# Patient Record
Sex: MALE | Race: WHITE | NOT HISPANIC OR LATINO | Employment: STUDENT | URBAN - METROPOLITAN AREA
[De-identification: names, ages, dates, MRNs, and addresses within clinical notes are randomized per-mention and may not be internally consistent; named-entity substitution may affect disease eponyms.]

---
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
## 2018-01-07 ENCOUNTER — HEALTH MAINTENANCE LETTER (OUTPATIENT)
Age: 18
End: 2018-01-07

## 2020-06-28 ENCOUNTER — HOSPITAL ENCOUNTER (EMERGENCY)
Facility: CLINIC | Age: 20
Discharge: HOME | End: 2020-06-28
Attending: NURSE PRACTITIONER | Admitting: NURSE PRACTITIONER
Payer: COMMERCIAL

## 2020-06-28 VITALS
SYSTOLIC BLOOD PRESSURE: 127 MMHG | HEIGHT: 73 IN | DIASTOLIC BLOOD PRESSURE: 83 MMHG | BODY MASS INDEX: 24.52 KG/M2 | TEMPERATURE: 98.1 F | WEIGHT: 185 LBS | RESPIRATION RATE: 16 BRPM | OXYGEN SATURATION: 99 %

## 2020-06-28 DIAGNOSIS — T14.8XXA AVULSION, SKIN: ICD-10-CM

## 2020-06-28 PROCEDURE — 25000128 H RX IP 250 OP 636: Performed by: NURSE PRACTITIONER

## 2020-06-28 PROCEDURE — 99283 EMERGENCY DEPT VISIT LOW MDM: CPT | Mod: 25

## 2020-06-28 PROCEDURE — 90471 IMMUNIZATION ADMIN: CPT

## 2020-06-28 PROCEDURE — 90715 TDAP VACCINE 7 YRS/> IM: CPT | Performed by: NURSE PRACTITIONER

## 2020-06-28 RX ADMIN — CLOSTRIDIUM TETANI TOXOID ANTIGEN (FORMALDEHYDE INACTIVATED), CORYNEBACTERIUM DIPHTHERIAE TOXOID ANTIGEN (FORMALDEHYDE INACTIVATED), BORDETELLA PERTUSSIS TOXOID ANTIGEN (GLUTARALDEHYDE INACTIVATED), BORDETELLA PERTUSSIS FILAMENTOUS HEMAGGLUTININ ANTIGEN (FORMALDEHYDE INACTIVATED), BORDETELLA PERTUSSIS PERTACTIN ANTIGEN, AND BORDETELLA PERTUSSIS FIMBRIAE 2/3 ANTIGEN 0.5 ML: 5; 2; 2.5; 5; 3; 5 INJECTION, SUSPENSION INTRAMUSCULAR at 18:51

## 2020-06-28 ASSESSMENT — ENCOUNTER SYMPTOMS: WOUND: 1

## 2020-06-28 ASSESSMENT — MIFFLIN-ST. JEOR: SCORE: 1898.03

## 2020-06-28 NOTE — ED AVS SNAPSHOT
Emergency Department  [address] [facility] [address]  Phone:  [phone]  Fax:  [phone]                                    Mario Alberto Khalil   MRN: [id number]    Department:   Emergency Department   Date of Visit:  6/28/2020           After Visit Summary Signature Page    I have received my discharge instructions, and my questions have been answered. I have discussed any challenges I see with this plan with the nurse or doctor.    ..........................................................................................................................................  Patient/Patient Representative Signature      ..........................................................................................................................................  Patient Representative Print Name and Relationship to Patient    ..................................................               ................................................  Date                                   Time    ..........................................................................................................................................  Reviewed by Signature/Title    ...................................................              ..............................................  Date                                               Time          22EPIC Rev 08/18

## 2020-06-28 NOTE — ED NOTES
Patient has an avulsion to tip of left thumb from a mandolin.     Patient's respirations are even and non labored. Patient denies CP or SOB.

## 2020-06-28 NOTE — LETTER
June 28, 2020      To Whom It May Concern:      Mario Alberto Khalil was seen in our Emergency Department today, 06/28/20.  Please excuse Mario Alberto from work on July 1, 2, 3 due to injury.  He will need to wear the finger splint for 10-14 days while working.  He may return to work when improved.    Sincerely,        Halima Vragas, CNP

## 2020-06-28 NOTE — ED PROVIDER NOTES
"  History   Chief Complaint  Laceration      HPI   Mario Alberto Khalil is a 19 year old right handed male who presents to the emergency department for evaluation of laceration. The patient was cutting potatoes with a Mandolin and suffered an avulsion to the tip of the left thumb. His last tetanus is from 2010.    Allergies  No Known Drug Allergies     Medications  Vyvanse     Past Medical History  Attention deficit hyperactive disorder      Past Surgical History  The patient does not have any pertinent past surgical history.     Family History  No past pertinent family history.    Social History:  Smoking Status: Never Smoker  Smokeless Tobacco: Never Used  Alcohol Use: Yes  Drug Use: No  PCP: Marilee Shaikh    Review of Systems   Skin: Positive for wound.   All other systems reviewed and are negative.    Physical Exam     Patient Vitals for the past 24 hrs:   BP Temp Temp src Heart Rate Resp SpO2 Height Weight   06/28/20 1808 127/83 98.1  F (36.7  C) Oral 73 16 99 % 1.854 m (6' 1\") 83.9 kg (185 lb)       Physical Exam  Nursing notes reviewed. Vitals reviewed.  General: Alert. Well kept.  Eyes:  Conjunctiva non-injected, non-icteric.  Neck/Throat: Moist mucous membranes. Normal voice.  Cardiac: Regular rhythm. Normal heart sounds.  Pulmonary: Clear and equal breath sounds bilaterally.   Musculoskeletal: Normal gross range of motion of all 4 extremities.   Neurological: Alert and oriented x4.   Skin: Warm and dry. 1 cm superficial skin avulsion left thumb radial aspect distal to IP joint.  Psych: Affect normal. Good eye contact.    Emergency Department Course     Interventions:  Tdap 0.5 mL IM    Emergency Department Course:  Past medical records, nursing notes, and vitals reviewed.    1820 I performed an exam of the patient as documented above.     Findings and plan explained to the Patient. Patient discharged home with instructions regarding supportive care, medications, and reasons to return. The importance of close " follow-up was reviewed.     I personally answered all related questions prior to discharge.     Impression & Plan     Medical Decision Making:  Mario Alberto Khalil is a 19 year old male who presents for evaluation of a partial fingertip avulsion after cutting with a Mandolin. Tetanus status addressed this visit. This wound could not be completely closed due to tissue loss and will have to heal by secondary intention or possible surgical intervention; will have them follow up with primary care provider to address this. There was no exposed bone/tendon/joint. Sensation is intact distally in that finger. Gelfoam was applied and dressing change instructions given to patient. No other injury on head to toe trauma exam to warrant further workup today    Diagnosis:    ICD-10-CM    1. Avulsion, skin  T14.8XXA        Disposition:  Discharged to home.    Discharge Medications:  Discharge Medication List as of 6/28/2020  6:46 PM          Scribe Disclosure:  I, Surekha Ridley, am serving as a scribe at 6:17 PM on 6/28/2020 to document services personally performed by Halima Vargas, AUBREE based on my observations and the provider's statements to me.      Halima Vargas, HEIKE  06/28/20 [id number]

## 2020-09-09 ENCOUNTER — NURSE TRIAGE (OUTPATIENT)
Dept: NURSING | Facility: CLINIC | Age: 20
End: 2020-09-09

## 2020-09-10 NOTE — TELEPHONE ENCOUNTER
Caller has mono diagnosed at [facility] today; temperature is 103.6; took only  200 mg of ibuprofen  4 hrs prior   Triage protocol reviewed   advised in fever care and to home care for mono  Advised to call back for  Fever  >105 after taking antipyretic or for any new or worsening symptoms per protocol   Caller understands and will comply   Katie Mcdaniel RN  FNA    Additional Information    Negative: Shock suspected (e.g., cold/pale/clammy skin, too weak to stand, low BP, rapid pulse)    Negative: Difficult to awaken or acting confused (e.g., disoriented, slurred speech)    Negative: [1] Difficulty breathing AND [2] bluish lips, tongue or face    Negative: New onset rash with multiple purple (or blood-colored) spots or dots    Negative: Sounds like a life-threatening emergency to the triager    Negative: Fever in a cancer patient who is currently (or recently) receiving chemotherapy or radiation therapy, or cancer patient who has metastatic or end-stage cancer and is receiving palliative care    Negative: Pregnant    Negative: Postpartum (from 0 to 6 weeks after delivery)    Negative: Fever onset within 24 hours of receiving VACCINE    Negative: [1] Fever AND [2] within 21 days of Ebola EXPOSURE    Negative: Other symptom is present, see that guideline  (e.g., symptoms of cough, runny nose, sore throat, earache, abdominal pain, diarrhea, vomiting)    Negative: [1] Headache AND [2] stiff neck (can't touch chin to chest)    Negative: Difficulty breathing    Negative: IV drug abuse    Negative: [1] Drinking very little AND [2] dehydration suspected (e.g., no urine > 12 hours, very dry mouth, very lightheaded)    Negative: Patient sounds very sick or weak to the triager  (Exception: mild weakness and hasn't taken fever medicine)    Negative: Fever > 104 F (40 C)    Negative: [1] Fever > 101 F (38.3 C) AND [2] age > 60    Negative: [1] Fever > 100.0 F (37.8 C) AND [2] bedridden (e.g., nursing home patient, CVA, chronic  illness, recovering from surgery)    Negative: [1] Fever > 100.0 F (37.8 C) AND [2] indwelling urinary catheter (e.g., Mercedes, [place])    Negative: [1] Fever > 100.0 F (37.8 C) AND [2] has port (portacath), central line, or PICC line    Negative: [1] Fever > 100.0 F (37.8 C) AND [2] diabetes mellitus or weak immune system (e.g., HIV positive, cancer chemo, splenectomy, organ transplant, chronic steroids)    Negative: [1] Fever > 100.0 F (37.8 C) AND [2] surgery in the last month    Negative: Transplant patient (e.g., kidney, liver, lung, heart)    Negative: Fever present > 3 days (72 hours)    Negative: [1] Fever > 100.0 F (37.8 C) AND [2] foreign travel to a developing country in the last month    Negative: [1] Intermittent fever > 100.0 F (37.8 C) AND [2] lasts > 3 weeks    [1] Fever AND [2] no signs of serious infection or localizing symptoms (all other triage questions negative)    Negative: Difficult to awaken or confused    Negative: Stiff neck (can't touch chin to chest)    Negative: [1] Drooling or spitting out saliva (because can't swallow) AND [2] new onset AND [3] normal breathing    Negative: [1] Drinking very little AND [2] signs of dehydration (no urine > 8 hours, very dry mouth, no tears, etc.)    Negative: [1] Difficulty breathing (per caller) AND [2] not severe    Negative: [1] SEVERE headache (excruciating) AND [2] not improved after 2 hours of pain medicine    Negative: [1] Abdominal pain AND [2] moderate or severe    Negative: [1] Shoulder or upper back pain AND [2] on left side only    Negative: [1] New-onset pale skin AND [2] major change    Negative: [1] Blood-colored or deep red dots on skin AND [2] widespread    Negative: Child sounds very sick or weak to the triager    Negative: [1] Blood-colored or deep red dots on skin AND [2] localized    Negative: [1] Refuses to drink anything AND [2] for > 12 hours    Negative: Unable to open mouth completely    Negative: [1] Fever AND [2] > 105 F (40.6  C) by any route OR axillary > 104 F (40 C)    Negative: [1] Fever higher AND [2] caller can't be reassured AND [3] other symptoms unchanged    Negative: [1] New symptom AND [2] could be serious    Negative: Triager concerned about patient's response to recommended treatment plan    Negative: [1] Recent medical visit within 48 hours AND [2] symptoms worse (Exception: fever higher)    Negative: [1] Caller has urgent question AND [2] triager unable to answer    Negative: [1] New-onset pale skin AND [2] more pale than normal (Exception: transient pallor from being cold)    Negative: Earache also present    Negative: [1] Age > 5 years AND [2] sinus pain (not just congestion) is also present    Negative: [1] Fever returns after gone for over 24 hours AND [2] symptoms worse or not improved    Negative: [1] SEVERE sore throat (interferes with normal activities) AND [2] not improved after 2 hours of pain medicine AND [3] drinking adequately    Negative: [1] Caller has non-urgent question AND [2] triager unable to answer    Negative: [1] After 4 weeks AND [2] wants to return to sports    Negative: [1] After 7 days AND [2] fever persists    Negative: [1] After 14 days AND [2] not back to school    Negative: [1] After 4 weeks AND [2] not fully recovered    [1] Mono diagnosed AND [2] fever higher BUT [3] other symptoms unchanged    Negative: [1] Mono diagnosed AND [2] no complications per triage AND [3] caller has additional questions    Protocols used: FEVER-A-AH, MONONUCLEOSIS FOLLOW-UP CALL-P-

## 2023-01-30 ENCOUNTER — OFFICE VISIT (OUTPATIENT)
Dept: FAMILY MEDICINE | Facility: CLINIC | Age: 23
End: 2023-01-30

## 2023-01-30 VITALS
HEIGHT: 73 IN | SYSTOLIC BLOOD PRESSURE: 124 MMHG | BODY MASS INDEX: 26.72 KG/M2 | HEART RATE: 67 BPM | OXYGEN SATURATION: 100 % | DIASTOLIC BLOOD PRESSURE: 73 MMHG | WEIGHT: 201.6 LBS

## 2023-01-30 DIAGNOSIS — F90.9 ATTENTION DEFICIT HYPERACTIVITY DISORDER (ADHD), UNSPECIFIED ADHD TYPE: ICD-10-CM

## 2023-01-30 DIAGNOSIS — F32.5 MAJOR DEPRESSIVE DISORDER IN FULL REMISSION, UNSPECIFIED WHETHER RECURRENT (H): ICD-10-CM

## 2023-01-30 DIAGNOSIS — Z00.00 ROUTINE GENERAL MEDICAL EXAMINATION AT A HEALTH CARE FACILITY: Primary | ICD-10-CM

## 2023-01-30 PROCEDURE — 99385 PREV VISIT NEW AGE 18-39: CPT | Performed by: FAMILY MEDICINE

## 2023-01-30 NOTE — PATIENT INSTRUCTIONS
Preventive Health Recommendations  Male Ages 21 - 25     Yearly exam:             See your health care provider every year in order to  o   Review health changes.   o   Discuss preventive care.    o   Review your medicines if your doctor has prescribed any.  You should be tested each year for STDs (sexually transmitted diseases).   Talk to your provider about cholesterol testing.    If you are at risk for diabetes, you should have a diabetes test (fasting glucose).    Shots: Get a flu shot each year. Get a tetanus shot every 10 years.     Nutrition:  Eat at least 5 servings of fruits and vegetables daily.   Eat whole-grain bread, whole-wheat pasta and brown rice instead of white grains and rice.   Get adequate calcium and Vitamin D.     Lifestyle  Exercise for at least 150 minutes a week (30 minutes a day, 5 days a week). This will help you control your weight and prevent disease.   Limit alcohol to one drink per day.   No smoking.   Wear sunscreen to prevent skin cancer.   See your dentist every six months for an exam and cleaning.   Thank you for coming in today! If you receive a survey via My Chart, mail or phone please let us know if there was anything you especially appreciated today or if there is any way we can improve our clinic. We value your input.     Likewise, we are working hard to attend to our digital reputation.    Please consider reviewing our clinic on [employer] and/or [employer] via the following links or QR codes:    [email or web address]                                          We truly appreciate you taking the time to do this.    General Information:    Today you had your appointment with Enrique Emerson MD  My hours are:    Monday 8 AM - 5 PM  Tuesday: 8 AM - 5 PM  Wednesday: 8 AM - 5 PM  Fridays: 8 AM - 5 PM    I am not in the office Thursdays. Therefore, non urgent calls received on Thursday will be addressed  when I am back in the office on Friday.    If lab work was done today as part of your evaluation you will generally be contacted via My Chart, mail, or phone with the results within 1-5 days. If there is an alarming result we will contact you by phone. Lab results come back at varying times, I generally wait until all labs are resulted before making comments on results. Please note labs are automatically released to My Chart once available.    If you need refills please contact your pharmacy. They will send a refill request to me to review. Please allow 3 business days for us to process all refill requests.    Please call or send a medical message with any questions or concerns.    Thank you for choosing [facility].  We truly appreciate you trusting us with your medical care.    Sincerely,    nErique Emerson MD

## 2023-01-30 NOTE — PROGRESS NOTES
3  SUBJECTIVE:   CC: Mario Alberto Khalil is an 22 year old male who presents for preventive health visit.     Patient has been advised of split billing requirements and indicates understanding: Yes     History of depression, doing better.  Was previously on wellbutrin and prozac.    ADHD: not currently on medication.was on vyvanse in past.  Doing well    Healthy Habits:    Do you get at least three servings of calcium containing foods daily (dairy, green leafy vegetables, etc.)? yes    Amount of exercise or daily activities, outside of work: 2-3 day(s) per week    Problems taking medications regularly No    Medication side effects: No    Have you had an eye exam in the past two years? no    Do you see a dentist twice per year? yes    Do you have sleep apnea, excessive snoring or daytime drowsiness?no        -------------------------------------    Today's PHQ-2 Score:   PHQ-2 ( 1999 [employer]) 1/30/2023   Q1: Little interest or pleasure in doing things 1   Q2: Feeling down, depressed or hopeless 1   PHQ-2 Score 2       Abuse: Current or Past(Physical, Sexual or Emotional)- No  Do you feel safe in your environment? Yes    Have you ever done Advance Care Planning? (For example, a Health Directive, POLST, or a discussion with a medical provider or your loved ones about your wishes): No, advance care planning information given to patient to review.  Patient plans to discuss their wishes with loved ones or provider.      Social History     Tobacco Use     Smoking status: Every Day     Types: Cigarettes     Smokeless tobacco: Never   Substance Use Topics     Alcohol use: Yes     If you drink alcohol do you typically have >3 drinks per day or >7 drinks per week? No                      Last PSA: No results found for: PSA    Reviewed orders with patient. Reviewed health maintenance and updated orders accordingly - Yes  Lab work is in process    Reviewed and updated as needed this visit by clinical staff   Tobacco  Allergies  Meds   "            Reviewed and updated as needed this visit by Provider                     ROS:  CONSTITUTIONAL: NEGATIVE for fever, chills, change in weight  INTEGUMENTARY/SKIN: NEGATIVE for worrisome rashes, moles or lesions  EYES: NEGATIVE for vision changes or irritation  ENT: NEGATIVE for ear, mouth and throat problems  RESP: NEGATIVE for significant cough or SOB  CV: NEGATIVE for chest pain, palpitations or peripheral edema  GI: NEGATIVE for nausea, abdominal pain, heartburn, or change in bowel habits   male: negative for dysuria, hematuria, decreased urinary stream, erectile dysfunction, urethral discharge  MUSCULOSKELETAL: NEGATIVE for significant arthralgias or myalgia  NEURO: NEGATIVE for weakness, dizziness or paresthesias  PSYCHIATRIC: NEGATIVE for changes in mood or affect    OBJECTIVE:   /73   Pulse 67   Ht 1.854 m (6' 1\")   Wt 91.4 kg (201 lb 9.6 oz)   SpO2 100%   BMI 26.60 kg/m    EXAM:  GENERAL: healthy, alert and no distress  EYES: Eyes grossly normal to inspection, PERRL and conjunctivae and sclerae normal  HENT: ear canals and TM's normal, nose and mouth without ulcers or lesions  NECK: no adenopathy, no asymmetry, masses, or scars and thyroid normal to palpation  RESP: lungs clear to auscultation - no rales, rhonchi or wheezes  CV: regular rate and rhythm, normal S1 S2, no S3 or S4, no murmur, click or rub, no peripheral edema and peripheral pulses strong  ABDOMEN: soft, nontender, no hepatosplenomegaly, no masses and bowel sounds normal   (male): normal male genitalia without lesions or urethral discharge, no hernia  MS: no gross musculoskeletal defects noted, no edema  SKIN: no suspicious lesions or rashes  NEURO: Normal strength and tone, mentation intact and speech normal  PSYCH: mentation appears normal, affect normal/bright    Diagnostic Test Results:  Labs reviewed in Epic    ASSESSMENT/PLAN:   Routine general medical examination at a health care facility  - discussed " "preventative guidelines, healthy diet, exercise and weight management    Attention deficit hyperactivity disorder (ADHD), unspecified ADHD type  Doing well without medication, follow up prn    Major depressive disorder in full remission, unspecified whether recurrent (H)  Doing well without medication, follow up prn        Patient has been advised of split billing requirements and indicates understanding: Yes  COUNSELING:  Reviewed preventive health counseling, as reflected in patient instructions       Regular exercise       Healthy diet/nutrition    Estimated body mass index is 26.6 kg/m  as calculated from the following:    Height as of this encounter: 1.854 m (6' 1\").    Weight as of this encounter: 91.4 kg (201 lb 9.6 oz).        He reports that he has been smoking cigarettes. He has never used smokeless tobacco.      Counseling Resources:  ATP IV Guidelines  Pooled Cohorts Equation Calculator  FRAX Risk Assessment  ICSI Preventive Guidelines  Dietary Guidelines for Americans, 2010  USDA's MyPlate  ASA Prophylaxis  Lung CA Screening    Enrique Emerson MD  [facility]  "

## 2024-05-04 ENCOUNTER — HEALTH MAINTENANCE LETTER (OUTPATIENT)
Age: 24
End: 2024-05-04

## 2025-05-17 ENCOUNTER — HEALTH MAINTENANCE LETTER (OUTPATIENT)
Age: 25
End: 2025-05-17